# Patient Record
Sex: FEMALE | Race: WHITE | URBAN - METROPOLITAN AREA
[De-identification: names, ages, dates, MRNs, and addresses within clinical notes are randomized per-mention and may not be internally consistent; named-entity substitution may affect disease eponyms.]

---

## 2017-03-21 ENCOUNTER — OFFICE VISIT (OUTPATIENT)
Dept: ORTHOPEDIC SURGERY | Age: 77
End: 2017-03-21

## 2017-03-21 VITALS
HEIGHT: 61 IN | BODY MASS INDEX: 34.93 KG/M2 | SYSTOLIC BLOOD PRESSURE: 142 MMHG | HEART RATE: 61 BPM | WEIGHT: 185 LBS | DIASTOLIC BLOOD PRESSURE: 68 MMHG

## 2017-03-21 DIAGNOSIS — M17.12 PRIMARY OSTEOARTHRITIS OF LEFT KNEE: ICD-10-CM

## 2017-03-21 DIAGNOSIS — G89.29 CHRONIC PAIN OF LEFT KNEE: Primary | ICD-10-CM

## 2017-03-21 DIAGNOSIS — Z96.651 S/P TKR (TOTAL KNEE REPLACEMENT) USING CEMENT, RIGHT: ICD-10-CM

## 2017-03-21 DIAGNOSIS — M71.22 BAKER'S CYST OF KNEE, LEFT: ICD-10-CM

## 2017-03-21 DIAGNOSIS — M25.562 CHRONIC PAIN OF LEFT KNEE: Primary | ICD-10-CM

## 2017-03-21 PROCEDURE — 20610 DRAIN/INJ JOINT/BURSA W/O US: CPT | Performed by: ORTHOPAEDIC SURGERY

## 2017-03-21 RX ORDER — ACETAMINOPHEN 500 MG
500 TABLET ORAL
COMMUNITY

## 2017-03-21 RX ORDER — METOPROLOL SUCCINATE 25 MG/1
TABLET, EXTENDED RELEASE ORAL DAILY
COMMUNITY
Start: 2017-02-24

## 2017-03-21 RX ORDER — ATORVASTATIN CALCIUM 40 MG/1
40 TABLET, FILM COATED ORAL
COMMUNITY
Start: 2017-02-10

## 2017-05-12 ENCOUNTER — OFFICE VISIT (OUTPATIENT)
Dept: ORTHOPEDIC SURGERY | Age: 77
End: 2017-05-12

## 2017-05-12 VITALS
DIASTOLIC BLOOD PRESSURE: 73 MMHG | SYSTOLIC BLOOD PRESSURE: 128 MMHG | BODY MASS INDEX: 34.93 KG/M2 | HEART RATE: 65 BPM | WEIGHT: 185 LBS | HEIGHT: 61 IN

## 2017-05-12 DIAGNOSIS — Z96.651 S/P TKR (TOTAL KNEE REPLACEMENT) USING CEMENT, RIGHT: ICD-10-CM

## 2017-05-12 DIAGNOSIS — M17.12 PRIMARY OSTEOARTHRITIS OF LEFT KNEE: ICD-10-CM

## 2017-05-12 DIAGNOSIS — G89.29 CHRONIC PAIN OF LEFT KNEE: Primary | ICD-10-CM

## 2017-05-12 DIAGNOSIS — M25.562 CHRONIC PAIN OF LEFT KNEE: Primary | ICD-10-CM

## 2017-05-12 DIAGNOSIS — M71.22 BAKER'S CYST OF KNEE, LEFT: ICD-10-CM

## 2017-05-12 PROCEDURE — 20610 DRAIN/INJ JOINT/BURSA W/O US: CPT | Performed by: ORTHOPAEDIC SURGERY

## 2017-06-23 ENCOUNTER — OFFICE VISIT (OUTPATIENT)
Dept: ORTHOPEDIC SURGERY | Age: 77
End: 2017-06-23

## 2017-06-23 VITALS
BODY MASS INDEX: 34.92 KG/M2 | HEART RATE: 60 BPM | WEIGHT: 184.97 LBS | HEIGHT: 61 IN | SYSTOLIC BLOOD PRESSURE: 152 MMHG | DIASTOLIC BLOOD PRESSURE: 74 MMHG

## 2017-06-23 DIAGNOSIS — M25.562 CHRONIC PAIN OF LEFT KNEE: Primary | ICD-10-CM

## 2017-06-23 DIAGNOSIS — M17.12 PRIMARY OSTEOARTHRITIS OF LEFT KNEE: ICD-10-CM

## 2017-06-23 DIAGNOSIS — M71.22 BAKER'S CYST OF KNEE, LEFT: ICD-10-CM

## 2017-06-23 DIAGNOSIS — G89.29 CHRONIC PAIN OF LEFT KNEE: Primary | ICD-10-CM

## 2017-06-23 DIAGNOSIS — Z96.651 S/P TKR (TOTAL KNEE REPLACEMENT) USING CEMENT, RIGHT: ICD-10-CM

## 2017-06-23 PROCEDURE — 20610 DRAIN/INJ JOINT/BURSA W/O US: CPT | Performed by: ORTHOPAEDIC SURGERY

## 2017-08-18 ENCOUNTER — OFFICE VISIT (OUTPATIENT)
Dept: ORTHOPEDIC SURGERY | Age: 77
End: 2017-08-18

## 2017-08-18 VITALS
DIASTOLIC BLOOD PRESSURE: 93 MMHG | HEIGHT: 61 IN | WEIGHT: 185 LBS | BODY MASS INDEX: 34.93 KG/M2 | HEART RATE: 76 BPM | SYSTOLIC BLOOD PRESSURE: 166 MMHG

## 2017-08-18 DIAGNOSIS — M71.22 BAKER'S CYST OF KNEE, LEFT: ICD-10-CM

## 2017-08-18 DIAGNOSIS — G89.29 CHRONIC PAIN OF LEFT KNEE: Primary | ICD-10-CM

## 2017-08-18 DIAGNOSIS — M25.562 CHRONIC PAIN OF LEFT KNEE: Primary | ICD-10-CM

## 2017-08-18 DIAGNOSIS — M17.12 PRIMARY OSTEOARTHRITIS OF LEFT KNEE: ICD-10-CM

## 2017-08-18 DIAGNOSIS — Z96.651 S/P TKR (TOTAL KNEE REPLACEMENT) USING CEMENT, RIGHT: ICD-10-CM

## 2017-08-18 PROCEDURE — 20610 DRAIN/INJ JOINT/BURSA W/O US: CPT | Performed by: ORTHOPAEDIC SURGERY

## 2017-08-18 RX ORDER — AMLODIPINE BESYLATE 5 MG/1
2.5 TABLET ORAL DAILY
COMMUNITY
Start: 2017-08-11

## 2017-10-13 ENCOUNTER — OFFICE VISIT (OUTPATIENT)
Dept: ORTHOPEDIC SURGERY | Age: 77
End: 2017-10-13

## 2017-10-13 ENCOUNTER — HOSPITAL ENCOUNTER (OUTPATIENT)
Dept: GENERAL RADIOLOGY | Facility: MEDICAL CENTER | Age: 77
Discharge: HOME | End: 2017-10-13
Attending: ORTHOPAEDIC SURGERY | Admitting: ORTHOPAEDIC SURGERY

## 2017-10-13 VITALS
HEART RATE: 60 BPM | HEIGHT: 61 IN | WEIGHT: 172 LBS | BODY MASS INDEX: 32.47 KG/M2 | DIASTOLIC BLOOD PRESSURE: 87 MMHG | SYSTOLIC BLOOD PRESSURE: 164 MMHG

## 2017-10-13 DIAGNOSIS — M25.562 CHRONIC PAIN OF LEFT KNEE: Primary | ICD-10-CM

## 2017-10-13 DIAGNOSIS — M17.12 PRIMARY OSTEOARTHRITIS OF LEFT KNEE: ICD-10-CM

## 2017-10-13 DIAGNOSIS — G89.29 CHRONIC PAIN OF LEFT KNEE: ICD-10-CM

## 2017-10-13 DIAGNOSIS — M71.22 BAKER'S CYST OF KNEE, LEFT: ICD-10-CM

## 2017-10-13 DIAGNOSIS — Z96.651 S/P TKR (TOTAL KNEE REPLACEMENT) USING CEMENT, RIGHT: ICD-10-CM

## 2017-10-13 DIAGNOSIS — G89.29 CHRONIC PAIN OF LEFT KNEE: Primary | ICD-10-CM

## 2017-10-13 DIAGNOSIS — M25.561 CHRONIC PAIN OF RIGHT KNEE: ICD-10-CM

## 2017-10-13 DIAGNOSIS — G89.29 CHRONIC PAIN OF RIGHT KNEE: ICD-10-CM

## 2017-10-13 DIAGNOSIS — M25.562 CHRONIC PAIN OF LEFT KNEE: ICD-10-CM

## 2017-10-13 PROCEDURE — 73562 X-RAY EXAM OF KNEE 3: CPT | Performed by: ORTHOPAEDIC SURGERY

## 2017-10-13 PROCEDURE — 20610 DRAIN/INJ JOINT/BURSA W/O US: CPT | Performed by: ORTHOPAEDIC SURGERY

## 2017-10-13 PROCEDURE — 99213 OFFICE O/P EST LOW 20 MIN: CPT | Performed by: ORTHOPAEDIC SURGERY

## 2017-10-13 NOTE — LETTER
FAXED/SENT TO Dr Pura Espino  10/13/17, 12:28 PM        Jasmin Nur MD  Orthopaedic Surgery & Sports Medicine  Knee & Shoulder Specialist      Dear Dr Pura Espino,    Thank you very much for your referral of Ms. Yenny Adan to me for evaluation and treatment. Attached below is my report and recommendations from William Nicolell Michoacano most recent office visit. I appreciate your confidence in me and thank you for allowing me the opportunity to care for your patients. If I can be of any further assistance to you on this or any other patient, please do not hesitate to contact me. Sincerely,    Jasmin Nur MD  Board Certified Orthopaedic Surgeon  Knee and Shoulder Surgery Specialist  Electronically signed by Jasmin Nur MD on 10/13/2017 at 12:28 PM     Contact Information:  Stephen Cooks,   [phone], [address]                             I have personally performed and/or participated in the history, physical exam and medical decision making and reviewed all pertinent clinical information unless otherwise noted.           Jasmin Nur MD  Orthopaedic Surgery & Sports Medicine  Knee & Shoulder Specialist       [address] [facility] OFFICE  [address] [place] [address], [address]nd Floor  [address] [place] [address], [address] [place] [address] [facility]    [phone] Option 3   [phone] Option 3  [phone] (fax)    [phone] (fax)       PATIENT: [address]    68 y.o.  female  [facility]: 1940   MRN:  [id number]       Date of current encounter: 10/13/2017  This encounter is evaluated as a:        New Patient Visit     Established Patient Visit    Post-Op Visit      Consult: requested by          Worker's Comp       Patient's PCP is Dr. Casa Adames post total right knee replacement using cement 6/10/14                 Subjective:     Chief Complaint   Patient presents with    Knee Pain ongoing evaluation & treatment of bilateral knees        HPI:  s/p Right TKR Surgery on 6/10/2014. Usha Andrews returns to the office today in follow-up of her right TKR and for evaluation and injection of her left knee today. She feels she is doing better. She states that she rarely has pain. Ice, and rest help. She would like an injection in her left knee today. 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    Baker's cyst of left knee    Primary osteoarthritis of left knee    Chronic pain of right knee    Chronic pain of left knee     Past Medical History:   Diagnosis Date    Arthritis     right ankle, knees    High blood pressure     High cholesterol     Mini stroke ([facility] [address] 01/2017    Rosacea     Thyroid disease      Past Surgical History:   Procedure Laterality Date    ANKLE SURGERY  22 years ago    COLONOSCOPY      KNEE ARTHROPLASTY Right 6/10/14    RIGHT TOTAL KNEE REPLACEMENT              KNEE SURGERY  6/10/14    right TKR       Allergies:  Review of patient's allergies indicates no known allergies. Medications:  Prior to Visit Medications    Medication Sig Taking?  Authorizing Provider   amLODIPine (NORVASC) 5 MG tablet   Historical Provider, MD   aspirin 81 MG tablet Take 81 mg by mouth  Historical Provider, MD   atorvastatin (LIPITOR) 40 MG tablet Take 40 mg by mouth  Historical Provider, MD   metoprolol succinate (TOPROL XL) 25 MG extended release tablet daily   Historical Provider, MD acetaminophen (TYLENOL) 500 MG tablet Take 500 mg by mouth  Historical Provider, MD   enalapril (VASOTEC) 20 MG tablet 20 mg 2 times daily   Historical Provider, MD   calcium carbonate (TUMS) 500 MG chewable tablet Take  by mouth. Historical Provider, MD   hydrochlorothiazide (HYDRODIURIL) 25 MG tablet Take  by mouth daily. Historical Provider, MD   levothyroxine (SYNTHROID) 50 MCG tablet Take  by mouth daily. Historical Provider, MD     Social History     Social History    Marital status:      Spouse name: N/A    Number of children: 3    Years of education: N/A     Occupational History    retired      Social History Main Topics    Smoking status: Never Smoker    Smokeless tobacco: Never Used    Alcohol use No    Drug use: No    Sexual activity: Not on file     Other Topics Concern    Not on file     Social History Narrative    No narrative on file     No family history on file. Review of Systems (ROS):    Performed. Brittani Adan's review of systems has been performed (Musculoskeletal, Integumentary, CardioPulmonary, Neurological focused) by intake and observation. All past and current ROS forms have been scanned into the medical record. She has been instructed to contact her primary care physician regarding ROS issues if not already being addressed at this time. There are no recent changes. The most recent ROS was scanned into media on 3/21/2017. Objective:   Physical Exam  Vital Signs:  BP (!) 163/80   Pulse 58   Ht 5' 1\" (1.549 m)   Wt 172 lb (78 kg)   BMI 32.50 kg/m²      Constitution:  Generally, Paz Pardo is  alert,  appears stated age, and  in no distress.   Her general body habitus is  Cachectic   Thin   Normal   Obese   Morbidly Obese    Head:  Normocephalic  Eyes:  Extra-occular muscles intact    Left Ear:  External Ear normal   Right Ear:  External Ear normal   Nose:  Normal  Mouth:  Oral mucosa moist   No perioral lesions or loosening. XRay images were reviewed by me personally today 10/13/17. I have reviewed these XRays with her today and I gave her a copy of the AP XRay. Assessment (Medical Decision Making):      (ICD10 code: Z46)       Right TKR (Total knee replacement) (ICD10 code: M55.983)    Sabrina Grullon is a 68y.o. year old female with the following diagnosis:    1. Chronic pain of left knee  XR KNEE LEFT (3 VIEWS)    SC ARTHROCENTESIS ASPIR&/INJ MAJOR JT/BURSA W/O US    SC METHYLPREDNISOLONE 40 MG INJ   2. Primary osteoarthritis of left knee  SC ARTHROCENTESIS ASPIR&/INJ MAJOR JT/BURSA W/O US    SC METHYLPREDNISOLONE 40 MG INJ   3. Baker's cyst of knee, left     4. Chronic pain of right knee  XR KNEE RIGHT (3 VIEWS)   5. S/P TKR (total knee replacement) using cement, right on 6/10/2014         Her overall course:         Responding adequately to ongoing treatment after surgery (right knee) and responding to conservative treatment (left knee)      Worsening despite postoperative treatment       Unchanged despite postoperative treatment     Plan (Medical Decision Making): MEDICATIONS/REFILLS prescribed today are listed below. No refills today     Physical Therapy/HEP activities as tolerated      Script: 2 x per week x 4 weeks     Ice to affected area: 15-20 minutes 4 x day     Over the Counter/Suppliment Tx     Drinking 6-8 oz of Tart Cherry Juice     Vit D 3 (at least 2,000 IU/day)     We did talk about a left total knee replacement however currently her symptoms are being controlled with conservative treatment. She would like an injection in her left knee today. PROCEDURE NOTE: LEFT KNEE INJECTION  10/13/2017 at 12:27 PM   Procedure: Injection  Verbal consent was obtained. Risks and benefits were explained. Questions were encouraged and answered.       Timeout Verification Completed including:    Correct patient: Sabrina Grullon was identified    Correct procedure First phase: First phase is The Pain Phase. There is plenty of pain medication (and pharmacy can be consulted if needed). TKR is a painful operation however pain management has improved significantly. Second phase: The second phase is Not Sleeping Phase. It is common for total knee replacement patients not to sleep well after total knee replacement. This can go on for several months. Third phase: The third phase is Depression. The \"not sleeping at night phase\" leads to the third phase in which patients often experience depression/the blues, feeling that \"this will never get any better\". It will get better. Fourth phase: The fourth phase is Swelling and Warmth. Around 4-6 weeks, patients will start to compare their total knee replacement knee with their other knee. They note that the total knee replacement knee is still a bit more swollen and a bit more warm compared to the other knee. This is normal.    Fifth phase: The fifth phase is \"my knee makes noises\" phase. It is common for patients to note that the total knee replacement makes noises. Terrence Weinstein was instructed to apply ice to the injection area for 15 - 20 minutes several times a day to decrease pain and and swelling. Ice (\"ICE IS YOUR FRIEND\": try using a bag of frozen peas or corn) for 15  20 minutes 3 x day. Limit activities today. You may resume normal activities tomorrow if you have no pain. For severe pain:  If after hours, she is to go to Emergency Room. During office hours she must come in to the office. Terrence Weinstein was instructed to call the office if there are any questions or concerns related to her condition. I have asked her to schedule a follow-up appointment for 6-8 weeks from now for re-evaluation and possible repeat injection. She is specifically instructed to contact the office between now & her scheduled appointment if she has concerns related to her condition.  She is welcome to call for an appointment sooner if she has any additional concerns or questions. General Medication Instructions:  Any prescriptions must be used as directed. If you have any concerns, questions or require refills, please contact our office. If you experience any adverse reactions, stop the medication and call our office immediately. If you designate a preferred pharmacy, appropriate prescriptions will be sent to your preferred pharmacy for pickup to be use as directed. Patient Driving Instructions:  No driving if you are taking narcotic pain medications or muscle relaxers. PATIENT REMINDER:   Carry a list of your medications and allergies with you at all times. Call your pharmacy and our office at least 1 week in advance to refill prescriptions. Narcotic medications will not be refilled after hours or on weekends. ATTENTION    As of October 2, 2014, the [place] [address] ([address]) has mandated that ALL PRESCRIPTION PAIN MEDICINE cannot be called into your pharmacy. This includes:    Oxycodone (Percocet)  Hydrocodone (Vicodin, Norco)  Tramadol (Ultram)  Other    These medications must have prescriptions which are written and signed by your doctor (Dr. Geni Fink). This means that you must call ahead and come in to the office to  the paper prescription and take it to your pharmacy. We are sorry for any inconvenience but this is now the law. Erik Vogel MD  Board Certified Orthopaedic Surgeon  Knee and Shoulder Surgery Specialist    Contact Information:  Warner Gonzalezens,   [phone], [address]         IMPORTANT NARCOTIC INFORMATION: PLEASE READ      DO NOT share your prescription medication with anyone! Sharing is illegal.  The prescription dose is based on your age, weight, and health problems. Sharing your narcotic prescription can lead to accidental death of the individual for which the prescription was not prescribed.   You may not know about his/her addiction problem. Always use the same pharmacy when filling your narcotic prescriptions. DO NOT mix your narcotic prescription with alcohol. Mixing the narcotic prescription medication with alcohol causes depressive effects including breathing problems, organ malfunction, and cardiac arrest.      Always keep your narcotic medication in a locked, secured location. Keep your medication private. This is to avoid individuals from taking your medication without your knowledge. This medication is highly sought after and locking your prescriptions will protect you from being a target of crime. DO NOT stock pile your medication. This also will protect you from being a target of crime. Dispose of any unused medications properly. Do not flush the medications. Look for appropriate waste collection programs in your community and drug take back events. DO NOT drive while taking narcotic pain medication or muscle relaxers. FOR MORE INFORMATION, CHECK OUT THIS RESOURCE    For your convenience, Dr. Lizz Booker has provided the following link that may be helpful for you if you would like more information on your Orthopaedic condition:    www. Orthoinfo. org         If you or someone you know struggles with weight issues and joint pain, please check out this important documentary:      \"Start Moving Start Living\" =  Http://startmovingstHumanAPI.[email or web address]       (WellkeeperUBE video SMSL FULL SD)           FALL PREVENTION:  SIMPLE TIPS    Vitamin D3:  Over-the-counter supplement of Vitamin D3, (at least 2,000 IU daily). Vitamin D3 is widely available without a prescription at pharmacies and buying clubs ([facility], [facility]) and on-line at sites such as Amazon.com. It comes in a variety of formulations (tablets, gelcaps, liquid) and doses (1,000 IU, 2,000 IU, 4,000 IU, 5,000 IU and even higher).  The right dose for most people is 2,000 IU per day but higher

## 2017-10-13 NOTE — PROGRESS NOTES
DEPO-MEDROL CORTISONE INJECTION  NDC# [address]-09  LOT# [id number]  EXP.  DATE: 1/2020  SITE: Lt. Knee

## 2017-10-13 NOTE — PROGRESS NOTES
Mauro Snellen MD  Orthopaedic Surgery & Sports Medicine  Knee & Shoulder Specialist      [x] [facility] OFFICE   [] [facility] OFFICE  [address]  [address] [address], [address] [place] [address] [address]    [phone] Option 3   [phone] Option 3  [phone] (fax)    [phone] (fax)       PATIENT: Liliana Hernandez    68 y.o.  female  [facility]: 1940   MRN:  [id number]       Date of current encounter: 10/13/2017  This encounter is evaluated as a:       [] New Patient Visit    [x] Established Patient Visit   [] Post-Op Visit     [] Consult: requested by         [] Worker's Comp       Patient's PCP is Dr. Javed Ozuna post total right knee replacement using cement 6/10/14                 Subjective:     Chief Complaint   Patient presents with    Knee Pain     ongoing evaluation & treatment of bilateral knees        HPI:  s/p Right TKR Surgery on 6/10/2014. Liliana Hernandez returns to the office today in follow-up of her right TKR and for evaluation and injection of her left knee today. She feels she is doing better. She states that she rarely has pain. Ice, and rest help. She would like an injection in her left knee today.     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 No refills today    [x] Physical Therapy/HEP activities as tolerated     [] Script: 2 x per week x 4 weeks    [x] Ice to affected area: 15-20 minutes 4 x day    [x] Over the Counter/Suppliment Tx     Drinking 6-8 oz of Tart Cherry Juice     Vit D 3 (at least 2,000 IU/day)    [x] We did talk about a left total knee replacement however currently her symptoms are being controlled with conservative treatment. She would like an injection in her left knee today. PROCEDURE NOTE: LEFT KNEE INJECTION  10/13/2017 at 12:27 PM   Procedure: Injection  Verbal consent was obtained. Risks and benefits were explained. Questions were encouraged and answered. Timeout Verification Completed including:    Correct patient: Brody Santana was identified    Correct procedure    Correct site & side    Correct equipment and supplies    Staff member: Zari Florez MD     Assistant: Sonya Leyva     Injection Site: Superolateral    The injection site was prepped with Chlora-Prep using aseptic technique and a left knee intra-articular injection was performed with ethyl chloride for anesthetic. Depomedrol 2 ml (40 mg/ml = 80 mg total) was injected. A sterile adhesive dressing was applied. Post procedure:  Sofia Adan tolerated the treatment well. Instructions to patient:  Appropriate post injections instructions were given to Brody Santana.          Return to Clinic/Follow - Up:  Brody Santana was asked to make a follow-up appointment:    [x] In a year, or sooner if needed for her right knee, 6-8 weeks if needed for her left knee    Brody Santana was instructed to call the office if her symptoms worsen or if same pharmacy when filling your narcotic prescriptions. [x] DO NOT mix your narcotic prescription with alcohol. Mixing the narcotic prescription medication with alcohol causes depressive effects including breathing problems, organ malfunction, and cardiac arrest.     [x] Always keep your narcotic medication in a locked, secured location. Keep your medication private. This is to avoid individuals from taking your medication without your knowledge. This medication is highly sought after and locking your prescriptions will protect you from being a target of crime. [x] DO NOT stock pile your medication. This also will protect you from being a target of crime. [x] Dispose of any unused medications properly. Do not flush the medications. Look for appropriate waste collection programs in your community and drug take back events. [x] DO NOT drive while taking narcotic pain medication or muscle relaxers. Reminder:  The phases following a total knee replacement that most patients often encounter. Briefly:    First phase: First phase is The Pain Phase. There is plenty of pain medication (and pharmacy can be consulted if needed). TKR is a painful operation however pain management has improved significantly. Second phase: The second phase is Not Sleeping Phase. It is common for total knee replacement patients not to sleep well after total knee replacement. This can go on for several months. Third phase: The third phase is Depression. The \"not sleeping at night phase\" leads to the third phase in which patients often experience depression/the blues, feeling that \"this will never get any better\". It will get better. Fourth phase: The fourth phase is Swelling and Warmth. Around 4-6 weeks, patients will start to compare their total knee replacement knee with their other knee.   They note that the total knee replacement knee is still a bit more swollen and a bit more warm compared to the other knee. This is normal.    Fifth phase: The fifth phase is \"my knee makes noises\" phase. It is common for patients to note that the total knee replacement makes noises. Marilee Carreno was instructed to apply ice to the injection area for 15 - 20 minutes several times a day to decrease pain and and swelling. Ice (\"ICE IS YOUR FRIEND\": try using a bag of frozen peas or corn) for 15  20 minutes 3 x day. Limit activities today. You may resume normal activities tomorrow if you have no pain. For severe pain:  If after hours, she is to go to Emergency Room. During office hours she must come in to the office. Marilee Carreno was instructed to call the office if there are any questions or concerns related to her condition. I have asked her to schedule a follow-up appointment for 6-8 weeks from now for re-evaluation and possible repeat injection. She is specifically instructed to contact the office between now & her scheduled appointment if she has concerns related to her condition. She is welcome to call for an appointment sooner if she has any additional concerns or questions. General Medication Instructions:  Any prescriptions must be used as directed. If you have any concerns, questions or require refills, please contact our office. If you experience any adverse reactions, stop the medication and call our office immediately. If you designate a preferred pharmacy, appropriate prescriptions will be sent to your preferred pharmacy for pickup to be use as directed. Patient Driving Instructions:  No driving if you are taking narcotic pain medications or muscle relaxers. PATIENT REMINDER:   Carry a list of your medications and allergies with you at all times. Call your pharmacy and our office at least 1 week in advance to refill prescriptions. Narcotic medications will not be refilled after hours or on weekends.          ATTENTION    As of October 2, 2014, the [facility] Drug Enforcement Agency ([place]) has mandated that ALL PRESCRIPTION PAIN MEDICINE cannot be called into your pharmacy. This includes:    Oxycodone (Percocet)  Hydrocodone (Vicodin, Norco)  Tramadol (Ultram)  Other    These medications must have prescriptions which are written and signed by your doctor (Dr. Milena Newton). This means that you must call ahead and come in to the office to  the paper prescription and take it to your pharmacy. We are sorry for any inconvenience but this is now the law. Virgilio Rdz MD  Board Certified Orthopaedic Surgeon  Knee and Shoulder Surgery Specialist    Contact Information:  Leopold Gut,   [phone], [address]         IMPORTANT NARCOTIC INFORMATION: PLEASE READ     [x] DO NOT share your prescription medication with anyone! Sharing is illegal.  The prescription dose is based on your age, weight, and health problems. Sharing your narcotic prescription can lead to accidental death of the individual for which the prescription was not prescribed. You may not know about his/her addiction problem. [x] Always use the same pharmacy when filling your narcotic prescriptions. [x] DO NOT mix your narcotic prescription with alcohol. Mixing the narcotic prescription medication with alcohol causes depressive effects including breathing problems, organ malfunction, and cardiac arrest.     [x] Always keep your narcotic medication in a locked, secured location. Keep your medication private. This is to avoid individuals from taking your medication without your knowledge. This medication is highly sought after and locking your prescriptions will protect you from being a target of crime. [x] DO NOT stock pile your medication. This also will protect you from being a target of crime. [x] Dispose of any unused medications properly. Do not flush the medications.   Look for appropriate waste collection programs in your community and

## 2017-10-13 NOTE — PATIENT INSTRUCTIONS
PATIENT INSTRUCTIONS  For Malik Peters  Office Visit with Dr Alaina Love on 10/13/2017    Activity Instructions:   No restrictions however if your knee is painful during various activities:    Modify or decrease your activity    Regular exercise is encouraged     Weight Bearing:   Full weightbearing    Incision Instructions: Ice to knee for 15  20 minutes 3 x day as needed    (\"ICE IS YOUR FRIEND\": try using a bag of frozen peas or corn)    If in the sun, protect incisions from sun by using:     Sunscreen 30-50 SPF, reapply regularly. Follow-up Care:   I have asked Chrissy Adan to schedule a follow-up appointment in 12 months. She is specifically instructed to contact the office between now & her scheduled appointment if she has concerns related to her condition. She is welcome to call for an appointment sooner if she has any additional concerns or questions. Medication Instructions: for Malik Lorraine  Allergies: Review of patient's allergies indicates no known allergies. Any prescriptions must be used as directed. Narcotic prescriptions will be printed out and given to you in the office. Non-narcotic prescriptions will be sent to your pharmacy for pickup to be use as directed unless you request a printed prescription. If you have any concerns, questions or require refills, please contact our office ([phone], Option 3). If you experience any adverse reactions, stop the medication and call our office immediately. Antibiotics after Total Knee Replacements:     [x] Prophylactic antibiotics before routine dental cleaning are not required. [x] Other surgeries/procedures do require antibiotics (colonoscopy, abcesses/ active infections, etc.)    If you are NOT allergic to penicillin: 2 grams of amoxicillin, cephalexin or cephradine (orally) OR 2 grams of ampicillin or 1 gram of cefazolin (intra-muscularly or intravenously) 1 hour before the procedure.     If you ARE allergic to penicillin[de-identified] 600 milligrams of clindamycin (orally or intravenously) 1 hour before the procedure. PLEASE NOTE: Antibiotics may vary based on clinical situation and culture results. PATIENT REMINDER:   Carry a list of your medications and allergies with you at all times. Call your pharmacy and our office at least 1 week in advance to refill prescriptions. Narcotic medications will not be refilled after hours or on weekends. ATTENTION    As of October 2, 2014, the [place] [address] ([address] [place] [address]) has mandated that ALL PRESCRIPTION PAIN MEDICINE cannot be called into your pharmacy. This includes:    Oxycodone (Percocet)  Hydrocodone (Vicodin, Norco)  Tramadol (Ultram)  Other    These medications must have prescriptions which are written and signed by your doctor (Dr. Al Vega). This means that you must call ahead and come in to the office to  the paper prescription and take it to your pharmacy. We are sorry for any inconvenience but this is now the law. Alaina Love MD  Board Certified Orthopaedic Surgeon  Knee and Shoulder Surgery Specialist    Contact Information:  Ida Hernandez,   [phone], Option 3         IMPORTANT NARCOTIC INFORMATION: PLEASE READ     [x] DO NOT share your prescription medication with anyone! Sharing is illegal.  The prescription dose is based on your age, weight, and health problems. Sharing your narcotic prescription can lead to accidental death of the individual for which the prescription was not prescribed. You may not know about his/her addiction problem. [x] Always use the same pharmacy when filling your narcotic prescriptions. [x] DO NOT mix your narcotic prescription with alcohol.   Mixing the narcotic prescription medication with alcohol causes depressive effects including breathing problems, organ malfunction, and cardiac arrest.     [x] Always keep your narcotic medication in a locked, secured location. Keep your medication private. This is to avoid individuals from taking your medication without your knowledge. This medication is highly sought after and locking your prescriptions will protect you from being a target of crime. [x] DO NOT stock pile your medication. This also will protect you from being a target of crime. [x] Dispose of any unused medications properly. Do not flush the medications. Look for appropriate waste collection programs in your community and drug take back events. [x] DO NOT drive while taking narcotic pain medication or muscle relaxers. Reminder:  The phases following a total knee replacement that most patients often encounter. Briefly:    First phase: First phase is The Pain Phase. There is plenty of pain medication (and pharmacy can be consulted if needed). TKR is a painful operation however pain management has improved significantly. Second phase: The second phase is Not Sleeping Phase. It is common for total knee replacement patients not to sleep well after total knee replacement. This can go on for several months. Third phase: The third phase is Depression. The \"not sleeping at night phase\" leads to the third phase in which patients often experience depression/the blues, feeling that \"this will never get any better\". It will get better. Fourth phase: The fourth phase is Swelling and Warmth. Around 4-6 weeks, patients will start to compare their total knee replacement knee with their other knee. They note that the total knee replacement knee is still a bit more swollen and a bit more warm compared to the other knee. This is normal.    Fifth phase: The fifth phase is \"my knee makes noises\" phase. It is common for patients to note that the total knee replacement makes noises.                 Sabrina Grullon was instructed to apply ice to the injection area for 15 - 20 minutes several times a day to decrease pain and and condition:    www. [email or web address]. org         If you or someone you know struggles with weight issues and joint pain, please check out this important documentary:      \"Start Moving Start Living\" =  [email or web address]       (YOUTUBE video SMSL FULL SD)           FALL PREVENTION:  SIMPLE TIPS    Vitamin D3:  Over-the-counter supplement of Vitamin D3, (at least 2,000 IU daily). Vitamin D3 is widely available without a prescription at pharmacies and buying clubs ([facility], [facility]) and on-line at sites such as Amazon.com. It comes in a variety of formulations (tablets, gelcaps, liquid) and doses (1,000 IU, 2,000 IU, 4,000 IU, 5,000 IU and even higher). The right dose for most people is 2,000 IU per day but higher doses are sometimes needed. You can take your vitamin D3 at any time of the day, with or without food, with or without calcium. Because vitamin D is long acting, if you miss your vitamin D on one day you can double up the dose on a later day. Exercise: Low impact exercise programs such as [employer]. Chair Raise Exercise. Yoga. Adult fitness classes at the  or community [place]. Physical Therapy: Formal physical therapy program to strengthen your lower extremities, improve your balance and gait. Home Assessment: Remove clutter and tripping hazards: loose/throw rugs, cords or cables. Install or placement of railings, grab bars around steps/stairs and in the bathroom (toilet, bath tub, sink). Improve lighting. Foot Wear:  Stable, well fitting. Avoid loose straps or ties, slippery soles. Vision/Eye Check Up: Have your vision checked yearly. Resources:  www.cdc.gov/injury/STEADI    1). STEADI: Stay Independent Self Risk Assessment    2). CDC Handouts:  How to prevent falls, Home Safety Fall Prevention         If you or someone you know struggles with weight issues and joint pain, please check out this important documentary:      \"Start Moving Start Living\" = [email or web address]       (YOUTUBE video SMSL FULL SD)

## 2017-12-04 ENCOUNTER — OFFICE VISIT (OUTPATIENT)
Dept: ORTHOPEDIC SURGERY | Age: 77
End: 2017-12-04

## 2017-12-04 VITALS
DIASTOLIC BLOOD PRESSURE: 86 MMHG | SYSTOLIC BLOOD PRESSURE: 138 MMHG | BODY MASS INDEX: 32.47 KG/M2 | HEIGHT: 61 IN | WEIGHT: 172 LBS | HEART RATE: 65 BPM

## 2017-12-04 DIAGNOSIS — M71.22 BAKER'S CYST OF KNEE, LEFT: ICD-10-CM

## 2017-12-04 DIAGNOSIS — G89.29 CHRONIC PAIN OF LEFT KNEE: Primary | ICD-10-CM

## 2017-12-04 DIAGNOSIS — Z96.651 S/P TKR (TOTAL KNEE REPLACEMENT) USING CEMENT, RIGHT: ICD-10-CM

## 2017-12-04 DIAGNOSIS — M17.12 PRIMARY OSTEOARTHRITIS OF LEFT KNEE: ICD-10-CM

## 2017-12-04 DIAGNOSIS — M25.562 CHRONIC PAIN OF LEFT KNEE: Primary | ICD-10-CM

## 2017-12-04 PROCEDURE — 20610 DRAIN/INJ JOINT/BURSA W/O US: CPT | Performed by: ORTHOPAEDIC SURGERY

## 2017-12-04 PROCEDURE — 99999 PR OFFICE/OUTPT VISIT,PROCEDURE ONLY: CPT | Performed by: ORTHOPAEDIC SURGERY

## 2017-12-04 NOTE — PATIENT INSTRUCTIONS
Tonya Chazden has provided the following link that may be helpful for you if you would like more information on your Orthopaedic condition:    www. [email or web address]. org         If you or someone you know struggles with weight issues and joint pain, please check out this important documentary:      \"Start Moving Start Living\" =  [email or web address]       (YOUTUBE video SMSL FULL SD)           FALL PREVENTION:  SIMPLE TIPS    Vitamin D3:  Over-the-counter supplement of Vitamin D3, (at least 2,000 IU daily). Vitamin D3 is widely available without a prescription at pharmacies and buying clubs ([facility], [facility]) and on-line at sites such as Amazon.com. It comes in a variety of formulations (tablets, gelcaps, liquid) and doses (1,000 IU, 2,000 IU, 4,000 IU, 5,000 IU and even higher). The right dose for most people is 2,000 IU per day but higher doses are sometimes needed. You can take your vitamin D3 at any time of the day, with or without food, with or without calcium. Because vitamin D is long acting, if you miss your vitamin D on one day you can double up the dose on a later day. Exercise: Low impact exercise programs such as [employer]. Chair Raise Exercise. Yoga. Adult fitness classes at the  or community center. Physical Therapy: Formal physical therapy program to strengthen your lower extremities, improve your balance and gait. Home Assessment: Remove clutter and tripping hazards: loose/throw rugs, cords or cables. Install or placement of railings, grab bars around steps/stairs and in the bathroom (toilet, bath tub, sink). Improve lighting. Foot Wear:  Stable, well fitting. Avoid loose straps or ties, slippery soles. Vision/Eye Check Up: Have your vision checked yearly. Resources:  www.cdc.gov/injury/STEADI    1). STEADI: Stay Independent Self Risk Assessment    2). CDC Handouts:  How to prevent falls, Home Safety Fall Prevention         If you or someone you know struggles with weight issues and joint pain, please check out this important documentary:      \"Start Moving Start Living\" =  [email or web address]       (YOUTUBE video SMSL FULL SD)

## 2017-12-04 NOTE — PROGRESS NOTES
Martita Giang MD  Orthopaedic Surgery & Sports Medicine  Knee & Shoulder Specialist      [x] [facility] OFFICE   [] [facility] OFFICE  [address] [place] [address], 2nd Floor  [address] [address], [address] [facility], [facility]    [phone] Option 3   [phone] Option 3  [phone] (fax)    [phone] (fax)       PATIENT: Sean Smith    68 y.o.  female  [facility]: 1940   MRN:  [id number]       Date of current encounter: 12/4/2017  This encounter is evaluated as a:       [] New Patient Visit    [x] Established Patient Visit   [] Post-Op Visit     [] Consult: requested by         [] Worker's Comp       Patient's PCP is Dr. Casey Alfredo post total right knee replacement using cement 6/10/14              Subjective:     Chief Complaint   Patient presents with    Knee Pain     ongoing evaluation and treatment of left knee        HPI:  Sean Smith is a 68y.o. year old,  female complaining of left knee pain. She states that her left knee pain is approximately a 4. At times she has throbbing achy pain. Her knee pain is intermittent, depending on what she is doing. Stretching, straightening and exercise aggravates her left knee. Her left knee pain is limiting behavior. Rest and Tylenol do help. Her knee is getting worse. She is feeling stiffness behind her knee. She is starting to think about a left total knee replacement but is not quite ready. She would like an injection in her left knee today.     PAIN ASSESSMENT:   Pain Assessment  Location of Pain: Knee  Location Modifiers: Left  Severity of Pain: 4  Quality of Pain: Throbbing, Aching  Duration of Pain: Persistent  Frequency of Pain: Intermittent  Date Pain First Started:  (ongoing for years)  Aggravating Factors: Stretching, Straightening, Exercise  Limiting Behavior: Some  Relieving Factors: Rest, Other (Comment) (tylenol)  Result of Injury: No  Work-Related Injury: No  Are there other pain locations you wish to document?: No    Patient Active Problem List   Diagnosis    Hyperlipidemia    Essential hypertension    Hypothyroidism    S/P TKR (total knee replacement) using cement, right    Baker's cyst of knee, left    Primary osteoarthritis of left knee    Chronic pain of right knee    Chronic pain of left knee     Past Medical History:   Diagnosis Date    Arthritis     right ankle, knees    High blood pressure     High cholesterol     Mini stroke ([address] 01/2017    Rosacea     Thyroid disease      Past Surgical History:   Procedure Laterality Date    ANKLE SURGERY  22 years ago    COLONOSCOPY      KNEE ARTHROPLASTY Right 6/10/14    RIGHT TOTAL KNEE REPLACEMENT              KNEE SURGERY  6/10/14    right TKR       Allergies:  Review of patient's allergies indicates no known allergies. Medications:  Prior to Visit Medications    Medication Sig Taking? Authorizing Provider   amLODIPine (NORVASC) 5 MG tablet 2.5 mg daily   Historical Provider, MD   aspirin 81 MG tablet Take 81 mg by mouth  Historical Provider, MD   atorvastatin (LIPITOR) 40 MG tablet Take 40 mg by mouth  Historical Provider, MD   metoprolol succinate (TOPROL XL) 25 MG extended release tablet daily   Historical Provider, MD   acetaminophen (TYLENOL) 500 MG tablet Take 500 mg by mouth  Historical Provider, MD   enalapril (VASOTEC) 20 MG tablet 20 mg 2 times daily   Historical Provider, MD   calcium carbonate (TUMS) 500 MG chewable tablet Take  by mouth. Historical Provider, MD   hydrochlorothiazide (HYDRODIURIL) 25 MG tablet Take  by mouth daily. Historical Provider, MD   levothyroxine (SYNTHROID) 50 MCG tablet Take  by mouth daily.   Historical Provider, MD     Social History     Social History    Marital status:      Spouse name: N/A    Number of children: 3    Years of education: N/A     Occupational History    retired      Social History Main Topics    Smoking status: Never Smoker  Smokeless tobacco: Never Used    Alcohol use No    Drug use: No    Sexual activity: Not on file     Other Topics Concern    Not on file     Social History Narrative    No narrative on file     No family history on file. Review of Systems (ROS):    [x]Performed. Regan Adan's review of systems has been performed (Musculoskeletal, Integumentary, CardioPulmonary, Neurological focused) by intake and observation. All past and current ROS forms have been scanned into the medical record. She has been instructed to contact her primary care physician regarding ROS issues if not already being addressed at this time. There are no recent changes. The most recent ROS was scanned into media on 3/21/2017. Objective:   Physical Exam  Vital Signs:  /86   Pulse 65   Ht 5' 1\" (1.549 m)   Wt 172 lb (78 kg)   BMI 32.50 kg/m²     Constitution:  Generally, Murray gNuyen is [x] alert, [x] appears stated age, and [x] in no distress. Her general body habitus is [] Cachectic  [] Thin  [] Normal  [x] Obese  [] Morbidly Obese    Head: [x] Normocephalic  Eyes: [x] Extra-occular muscles intact   Left Ear: [x] External Ear normal   Right Ear: [x] External Ear normal   Nose: [x] Normal  Mouth: [x] Oral mucosa moist  [x] No perioral lesions    Pulmonary: [x] Respirations unlabored and regular  Skin: [x] Warm [x] Well perfused     Psychiatric:   [x] Good judgement and insight  [x] Oriented to [x] person, [x] place, and [x] time  [x] Mood appropriate for circumstances    Gait:  Gait is [] Normal  [x] Impaired Slight limp  Assistive Device: [x] None  [] Knee Brace  [] Deana Pinks  [] Crutches   [] Gregg Majano   [] Wheelchair  [] Other    ORTHOPAEDIC KNEE EXAM:  []  RIGHT     [x]  LEFT     []  BILATERAL   Inspection:  [x] Skin intact without abrasion or lacerations.   [x] Ecchymosis:  [x] none  [] mild  [] moderate  [] severe  [x] Atrophy:  [x] none  [] mild  [] moderate  [] severe  [x] Effusion: [x] none  [] mild  [] moderate  [] severe  [] Scar / Surgical incision(s): [] A-Scope Portals  [] Open Surgical Incision(s)    Alignment:  [x] Normal  [] Varus [] Valgus    Range of Motion:  [] Normal Knee ROM         [] Deferred: acute injury/post-surgery/pain   [x] Limited ROM:     Palpation:   [] No Tenderness  [x] Tenderness: Baker's cyst posterior [x] mild  [] moderate  [] severe   [x] Patellofemoral Crepitation:  [] none  [x] mild  [] moderate  [] severe  [x] Baker's Cyst:  [] none  [x] small  [] medium  [] large    Motor Function:   [x] No gross motor weakness  [] Motor weakness:  [] mild  [] moderate  [] severe     Neurologic:  [x] Sensation to light touch intact   [x] Coordination / proprioception intact    Circulation:  [x] The limb is warm and well perfused  [x] Capillary refill is intact. [x] Edema  [x] none  [] mild  [] moderate  [] severe  [x] Venous stasis changes  [x] none  [] mild  [] moderate  [] severe    Contralateral Knee:  [x] No pain with ROM   [x]  Well-healed anterior midline surgical incision consistent with: right total knee replacement      Bilateral Hip Exam:  [x] Negative logroll     Data Reviewed:     No imaging studies were obtained today. XRays:  (3 views: AP, lateral and patella views) of her right knee taken on 10/13/17 showed a satisfactory TKR with no evidence of acute fracture or loosening. PROCEDURE NOTE: LEFT KNEE INJECTION  12/4/2017 at 2:15 PM   Procedure: Injection  Verbal consent was obtained. Risks and benefits were explained. Questions were encouraged and answered.       Timeout Verification Completed including:    Correct patient: Emily Rasmussen was identified    Correct procedure    Correct site & side    Correct equipment and supplies    Staff member: Bulmaro Jacob MD     Assistant: Akosua Hicks       Injection Site: Superolateral    The injection site was prepped with Chlora-Prep using aseptic technique and a left knee intra-articular injection was performed with ethyl chloride for anesthetic. Depomedrol 2 ml (40 mg/ml = 80 mg total) was injected. A sterile adhesive dressing was applied. Post procedure:  Fatuma Adan tolerated the treatment well. Instructions to patient:  Appropriate post injections instructions were given to Radha Medeiros. Assessment (Medical Decision Making):     Radha Medeiros is a 68y.o. year old female with the following diagnosis:    1. Chronic pain of left knee  WV ARTHROCENTESIS ASPIR&/INJ MAJOR JT/BURSA W/O US    WV METHYLPREDNISOLONE 40 MG INJ   2. Primary osteoarthritis of left knee  [place] ARTHROCENTESIS ASPIR&/INJ MAJOR JT/BURSA W/O US    [place] METHYLPREDNISOLONE 40 MG INJ   3. Baker's cyst of knee, left     4. S/P TKR (total knee replacement) using cement, right on 6/10/2014         Her overall course:       []  Responding adequately to ongoing treatment    [x]  Worsening despite conservative treatment    []  Unchanged despite conservative treatment    Plan (Medical Decision Making):      I discussed the diagnosis and the treatment options with Radha Medeiros today. In Summary:  1). The various treatment options were outlined and discussed with Radha Medeiros including:      [x] Conservative care options:      physical therapy, ice, NSAIDs, bracing, and activity modification       [x] The indications for therapeutic injections Steroids and Hyluronic Acid/Synvisc/Euflexxa/Supartz      [x] The indications for (possible future) operative intervention Left total knee replacement if conservative measures fail to control her pain    2). After considering the various options discussed, aRdha Medeiros elected to pursue a course of treatment that includes the following:      [x] MEDICATIONS/REFILLS prescribed today are listed below.        No refills today    [x] Physical Therapy/HEP Activities as tolerated       [] Script: 2 x per week x 4 weeks    [x] Ice to affected area: 15-20 minutes 4 x day    [x] will be sent to your preferred pharmacy for pickup to be use as directed. Patient Driving Instructions:  No driving if you are taking narcotic pain medications or muscle relaxers. PATIENT REMINDER:   Carry a list of your medications and allergies with you at all times. Call your pharmacy and our office at least 1 week in advance to refill prescriptions. Narcotic medications will not be refilled after hours or on weekends. ATTENTION    As of October 2, 2014, the [place] [address] ([facility]) has mandated that ALL PRESCRIPTION PAIN MEDICINE cannot be called into your pharmacy. This includes:    Oxycodone (Percocet)  Hydrocodone (Vicodin, Norco)  Tramadol (Ultram)  Other    These medications must have prescriptions which are written and signed by your doctor (Dr. Natalio Vasquez). This means that you must call ahead and come in to the office to  the paper prescription and take it to your pharmacy. We are sorry for any inconvenience but this is now the law. Cara Mitcehll MD  Board Certified Orthopaedic Surgeon  Knee and Shoulder Surgery Specialist    Contact Information:  Yonis Ring,   [phone], Option 3         IMPORTANT NARCOTIC INFORMATION: PLEASE READ     [x] DO NOT share your prescription medication with anyone! Sharing is illegal.  The prescription dose is based on your age, weight, and health problems. Sharing your narcotic prescription can lead to accidental death of the individual for which the prescription was not prescribed. You may not know about his/her addiction problem. [x] Always use the same pharmacy when filling your narcotic prescriptions. [x] DO NOT mix your narcotic prescription with alcohol. Mixing the narcotic prescription medication with alcohol causes depressive effects including breathing problems, organ malfunction, and cardiac arrest.     [x] Always keep your narcotic medication in a locked, secured location.   Keep your medication private. This is to avoid individuals from taking your medication without your knowledge. This medication is highly sought after and locking your prescriptions will protect you from being a target of crime. [x] DO NOT stock pile your medication. This also will protect you from being a target of crime. [x] Dispose of any unused medications properly. Do not flush the medications. Look for appropriate waste collection programs in your community and drug take back events. [x] DO NOT drive while taking narcotic pain medication or muscle relaxers. FOR MORE INFORMATION, CHECK OUT THIS RESOURCE    For your convenience, Dr. Milena Newton has provided the following link that may be helpful for you if you would like more information on your Orthopaedic condition:    www. Orthoinfo. org         If you or someone you know struggles with weight issues and joint pain, please check out this important documentary:      \"Start Moving Start Living\" =  Http://startmovingContapps.Sproutel       (AldagenUBE video SMSL FULL SD)           FALL PREVENTION:  SIMPLE TIPS    Vitamin D3:  Over-the-counter supplement of Vitamin D3, (at least 2,000 IU daily). Vitamin D3 is widely available without a prescription at pharmacies and buying clubs ([facility], [facility]) and on-line at sites such as Amazon.com. It comes in a variety of formulations (tablets, gelcaps, liquid) and doses (1,000 IU, 2,000 IU, 4,000 IU, 5,000 IU and even higher). The right dose for most people is 2,000 IU per day but higher doses are sometimes needed. You can take your vitamin D3 at any time of the day, with or without food, with or without calcium. Because vitamin D is long acting, if you miss your vitamin D on one day you can double up the dose on a later day. Exercise: Low impact exercise programs such as [employer]. Chair Raise Exercise. Yoga. Adult fitness classes at the  or community [place].     Physical Therapy: Formal physical therapy program to strengthen your lower extremities, improve your balance and gait. Home Assessment: Remove clutter and tripping hazards: loose/throw rugs, cords or cables. Install or placement of railings, grab bars around steps/stairs and in the bathroom (toilet, bath tub, sink). Improve lighting. Foot Wear:  Stable, well fitting. Avoid loose straps or ties, slippery soles. Vision/Eye Check Up: Have your vision checked yearly. Resources:  www.cdc.gov/injury/STEADI    1). STEADI: Stay Independent Self Risk Assessment    2). CDC Handouts: How to prevent falls, Home Safety Fall Prevention         If you or someone you know struggles with weight issues and joint pain, please check out this important documentary:      \"Start Moving Start Living\" =  Http://startmovingFoodtoeat.[employer]       (Lil Monkey Butt video SMSL FULL SD)            No orders of the defined types were placed in this encounter. Refills/New Prescriptions:  No orders of the defined types were placed in this encounter. Today's prescription medications will be e-scribed (when appropriate) to the Patient's Preferred Pharmacy:   [facility] [address] [place] [address] [place] [phone] [address] [facility] [address]  Phone: [phone] Fax: [phone]         Mauro Snellen MD  Board Certified Orthopaedic Surgeon  Knee and Shoulder Surgery Specialist  Electronically signed by Mauro Snellen MD on 12/4/2017 at 2:15 PM     Contact Information:  Johnathan Montes [place],   [phone], Option 3    This dictation was performed with a verbal recognition program [facility]) and it was checked for errors. It is possible that there are still dictated errors within this office note. If so, please bring any errors to my attention for an addendum. All efforts were made to ensure that this office note is accurate.      I have personally performed and/or participated in the history, physical exam and medical decision making and reviewed all pertinent clinical information unless otherwise noted.

## 2017-12-04 NOTE — PROGRESS NOTES
DEPO-MEDROL CORTISONE INJECTION  NDC# [address]-97  LOT# [id number]  EXP.  DATE: 4/2020  SITE: [facility]

## 2017-12-04 NOTE — PROGRESS NOTES
I have personally performed and/or participated in the history, physical exam and medical decision making and reviewed all pertinent clinical information unless otherwise noted.

## 2017-12-04 NOTE — LETTER
FAXED/SENT TO Dr Debra Duncan  12/4/17, 2:20 PM        Jim Lyman MD  Orthopaedic Surgery & Sports Medicine  Knee & Shoulder Specialist      Dear Dr Debra Duncan,    Thank you very much for your referral of Ms. Ankush Adan to me for evaluation and treatment. Attached below is my report and recommendations from William Jason Ríos most recent office visit. I appreciate your confidence in me and thank you for allowing me the opportunity to care for your patients. If I can be of any further assistance to you on this or any other patient, please do not hesitate to contact me.     Sincerely,    Jim Lyman MD  Board Certified Orthopaedic Surgeon  Knee and Shoulder Surgery Specialist  Electronically signed by Jim Lyman MD on 12/4/2017 at 2:20 PM     Contact Information:  Delmi Lemon,   [phone], Option 3                             I have personally performed and/or participated in the history, physical exam and medical decision making and reviewed all pertinent clinical information unless otherwise noted.                 Jim Lyman MD  Orthopaedic Surgery & Sports Medicine  Knee & Shoulder Specialist       [address] [facility] OFFICE  [address] [place] [address] [facility]    [phone] Option 3   [phone] Option 3  [phone] (fax)    [phone] (fax)       PATIENT: [address]    68 y.o.  female  [facility]: 1940   MRN:  [id number]       Date of current encounter: 12/4/2017  This encounter is evaluated as a:        New Patient Visit     Established Patient Visit    Post-Op Visit      Consult: requested by          Worker's Comp       Patient's PCP is Dr. Terrell Turcios post total right knee replacement using cement 6/10/14              Subjective:     Chief Complaint   Patient presents with    Knee Pain ongoing evaluation and treatment of left knee        HPI:  Reny Schmid is a 68y.o. year old,  female complaining of left knee pain. She states that her left knee pain is approximately a 4. At times she has throbbing achy pain. Her knee pain is intermittent, depending on what she is doing. Stretching, straightening and exercise aggravates her left knee. Her left knee pain is limiting behavior. Rest and Tylenol do help. Her knee is getting worse. She is feeling stiffness behind her knee. She is starting to think about a left total knee replacement but is not quite ready. She would like an injection in her left knee today. PAIN ASSESSMENT:   Pain Assessment  Location of Pain: Knee  Location Modifiers: Left  Severity of Pain: 4  Quality of Pain: Throbbing, Aching  Duration of Pain: Persistent  Frequency of Pain: Intermittent  Date Pain First Started:  (ongoing for years)  Aggravating Factors: Stretching, Straightening, Exercise  Limiting Behavior: Some  Relieving Factors: Rest, Other (Comment) (tylenol)  Result of Injury: No  Work-Related Injury: No  Are there other pain locations you wish to document?: No    Patient Active Problem List   Diagnosis    Hyperlipidemia    Essential hypertension    Hypothyroidism    S/P TKR (total knee replacement) using cement, right    Baker's cyst of knee, left    Primary osteoarthritis of left knee    Chronic pain of right knee    Chronic pain of left knee     Past Medical History:   Diagnosis Date    Arthritis     right ankle, knees    High blood pressure     High cholesterol     Mini stroke ([facility] [address] 01/2017    Rosacea     Thyroid disease      Past Surgical History:   Procedure Laterality Date    ANKLE SURGERY  22 years ago    COLONOSCOPY      KNEE ARTHROPLASTY Right 6/10/14    RIGHT TOTAL KNEE REPLACEMENT              KNEE SURGERY  6/10/14    right TKR       Allergies:  Review of patient's allergies indicates no known allergies. Data Reviewed:     No imaging studies were obtained today. XRays:  (3 views: AP, lateral and patella views) of her right knee taken on 10/13/17 showed a satisfactory TKR with no evidence of acute fracture or loosening. PROCEDURE NOTE: LEFT KNEE INJECTION  12/4/2017 at 2:15 PM   Procedure: Injection  Verbal consent was obtained. Risks and benefits were explained. Questions were encouraged and answered. Timeout Verification Completed including:    Correct patient: Rachna Lynch was identified    Correct procedure    Correct site & side    Correct equipment and supplies    Staff member: Rico Barros MD     Assistant: Jing Grant       Injection Site: Superolateral    The injection site was prepped with Chlora-Prep using aseptic technique and a left knee intra-articular injection was performed with ethyl chloride for anesthetic. Depomedrol 2 ml (40 mg/ml = 80 mg total) was injected. A sterile adhesive dressing was applied. Post procedure:  Ifrah Adan tolerated the treatment well. Instructions to patient:  Appropriate post injections instructions were given to Rachna Lynch. Assessment (Medical Decision Making):     Rachna Lynhc is a 68y.o. year old female with the following diagnosis:    1. Chronic pain of left knee  VA ARTHROCENTESIS ASPIR&/INJ MAJOR JT/BURSA W/O US    VA METHYLPREDNISOLONE 40 MG INJ   2. Primary osteoarthritis of left knee  VA ARTHROCENTESIS ASPIR&/INJ MAJOR JT/BURSA W/O US    VA METHYLPREDNISOLONE 40 MG INJ   3. Baker's cyst of knee, left     4. S/P TKR (total knee replacement) using cement, right on 6/10/2014         Her overall course:         Responding adequately to ongoing treatment      Worsening despite conservative treatment      Unchanged despite conservative treatment    Plan (Medical Decision Making):      I discussed the diagnosis and the treatment options with Rachna Lynch today.    In Summary: Vitamin D3:  Over-the-counter supplement of Vitamin D3, (at least 2,000 IU daily). Vitamin D3 is widely available without a prescription at pharmacies and buying clubs ([place]) and on-line at sites such as Amazon.com. It comes in a variety of formulations (tablets, gelcaps, liquid) and doses (1,000 IU, 2,000 IU, 4,000 IU, 5,000 IU and even higher). The right dose for most people is 2,000 IU per day but higher doses are sometimes needed. You can take your vitamin D3 at any time of the day, with or without food, with or without calcium. Because vitamin D is long acting, if you miss your vitamin D on one day you can double up the dose on a later day. Exercise: Low impact exercise programs such as [employer]. Chair Raise Exercise. Yoga. Adult fitness classes at the  or community [place]. Physical Therapy: Formal physical therapy program to strengthen your lower extremities, improve your balance and gait. Home Assessment: Remove clutter and tripping hazards: loose/throw rugs, cords or cables. Install or placement of railings, grab bars around steps/stairs and in the bathroom (toilet, bath tub, sink). Improve lighting. Foot Wear:  Stable, well fitting. Avoid loose straps or ties, slippery soles. Vision/Eye Check Up: Have your vision checked yearly. Resources:  www.cdc.gov/injury/STEADI    1). STEADI: Stay Independent Self Risk Assessment    2). CDC Handouts: How to prevent falls, Home Safety Fall Prevention         If you or someone you know struggles with weight issues and joint pain, please check out this important documentary:      \"Start Moving Start Living\" =  Http://startmovingstCogniCor Technologiesving.[email or web address]       ([employer] video SMSL FULL SD)            No orders of the defined types were placed in this encounter. Refills/New Prescriptions:  No orders of the defined types were placed in this encounter.     Today's prescription medications will be e-scribed (when appropriate) to the Patient's Preferred Pharmacy:   [facility] [address] Erick Stover [phone]  [address] [place] [address]  Phone: [phone] Fax: [phone]         Sabrina Madrigal MD  Board Certified Orthopaedic Surgeon  Knee and Shoulder Surgery Specialist  Electronically signed by Sabrina Madrigal MD on 12/4/2017 at 2:15 PM     Contact Information:  Ismael Olson,   [phone], Option 3    This dictation was performed with a verbal recognition program [facility]) and it was checked for errors. It is possible that there are still dictated errors within this office note. If so, please bring any errors to my attention for an addendum. All efforts were made to ensure that this office note is accurate. I have personally performed and/or participated in the history, physical exam and medical decision making and reviewed all pertinent clinical information unless otherwise noted.

## 2018-01-15 ENCOUNTER — OFFICE VISIT (OUTPATIENT)
Dept: ORTHOPEDIC SURGERY | Age: 78
End: 2018-01-15

## 2018-01-15 VITALS
SYSTOLIC BLOOD PRESSURE: 135 MMHG | HEART RATE: 84 BPM | HEIGHT: 61 IN | DIASTOLIC BLOOD PRESSURE: 75 MMHG | BODY MASS INDEX: 32.47 KG/M2 | WEIGHT: 171.96 LBS

## 2018-01-15 DIAGNOSIS — M17.12 PRIMARY OSTEOARTHRITIS OF LEFT KNEE: ICD-10-CM

## 2018-01-15 DIAGNOSIS — Z96.651 S/P TKR (TOTAL KNEE REPLACEMENT) USING CEMENT, RIGHT: ICD-10-CM

## 2018-01-15 DIAGNOSIS — M71.22 BAKER'S CYST OF KNEE, LEFT: ICD-10-CM

## 2018-01-15 DIAGNOSIS — G89.29 CHRONIC PAIN OF LEFT KNEE: Primary | ICD-10-CM

## 2018-01-15 DIAGNOSIS — M25.562 CHRONIC PAIN OF LEFT KNEE: Primary | ICD-10-CM

## 2018-01-15 PROCEDURE — 99999 PR OFFICE/OUTPT VISIT,PROCEDURE ONLY: CPT | Performed by: ORTHOPAEDIC SURGERY

## 2018-01-15 PROCEDURE — 20610 DRAIN/INJ JOINT/BURSA W/O US: CPT | Performed by: ORTHOPAEDIC SURGERY

## 2018-01-15 NOTE — PROGRESS NOTES
Sexual activity: Not on file     Other Topics Concern    Not on file     Social History Narrative    No narrative on file     No family history on file. Review of Systems (ROS):    [x]Performed. Rodriguekaiden Adan's review of systems has been performed (Musculoskeletal, Integumentary, CardioPulmonary, Neurological focused) by intake and observation. All past and current ROS forms have been scanned into the medical record. She has been instructed to contact her primary care physician regarding ROS issues if not already being addressed at this time. There are no recent changes. The most recent ROS was scanned into media on 3/21/2017. Objective:   Physical Exam  Vital Signs:  /75   Pulse 84   Ht 5' 0.98\" (1.549 m)   Wt 171 lb 15.3 oz (78 kg)   BMI 32.51 kg/m²     Constitution:  Generally, Eve Art is [x] alert, [x] appears stated age, and [x] in no distress. Her general body habitus is [] Cachectic  [] Thin  [] Normal  [x] Obese  [] Morbidly Obese    Head: [x] Normocephalic  Eyes: [x] Extra-occular muscles intact    Left Ear: [x] External Ear normal   Right Ear: [x] External Ear normal   Nose: [x] Normal  Mouth: [x] Oral mucosa moist  [x] No perioral lesions    Pulmonary: [x] Respirations unlabored and regular  Skin: [x] Warm [x] Well perfused     Psychiatric:   [x] Good judgement and insight  [x] Oriented to [x] person, [x] place, and [x] time. [x] Mood appropriate for circumstances. Gait:   Gait is [] Normal  [x] Impaired Slight limp  Assistive Device: [x] None  [] Knee Brace  [] [facility]  [] Crutches   [] Jessa Hardwick   [] Wheelchair  [] Other     ORTHOPAEDIC KNEE EXAM:  []  RIGHT     [x]  LEFT     []  BILATERAL   Inspection:  [x] Skin intact without abrasion or lacerations.   [x] Ecchymosis:  [x] none  [] mild  [] moderate  [] severe  [x] Atrophy:  [x] none  [x] mild  [] moderate  [] severe  [x] Effusion: [x] none  [] mild  [] moderate  [] severe    [] Scar / Surgical incision(s): [] knee replacement in her lifetime. 2). After considering the various options discussed, Jose Luis Izquierdo elected to pursue a course of treatment that includes the following:      [x] MEDICATIONS/REFILLS prescribed today are listed below. No refills today    [x] Physical Therapy/HEP Activities as tolerated       [] Script: 2 x per week x 4 weeks    [x] Ice to affected area: 15-20 minutes 4 x day    [x] Aspiration left Baker's cyst and injection into her left knee today      [x]  I have informed Jose Luis Izquierdo that I am planning to retire. I have assured her that appropriate follow-up care will be arranged. My official date for concluding my surgical practice was 12/31/2017, and the official longterm date is 3 months later to follow my postoperative patients for 90 days on 3/31/2018. [x]  I have referred her to Dr. Geovany Ngo for future injections as needed. Return to Clinic/Follow - Up:  Jose Luis Izquierdo was asked to make a follow-up appointment:    [x] 6-8 weeks if needed with Dr. Charlee Newton was instructed to call the office if her symptoms worsen or if new symptoms appear prior to the next scheduled visit. She is specifically instructed to contact the office between now & her scheduled appointment if she has concerns related to her condition or if she needs assistance in scheduling the above tests. She is welcome to call for an appointment sooner if she has any additional concerns or questions. Patient Education Materials Provided:    Patient Instructions     Jose Luis Izquierdo was instructed to apply ice to the injection area for 15 - 20 minutes several times a day to decrease pain and and swelling. Ice (\"ICE IS YOUR FRIEND\": try using a bag of frozen peas or corn) for 15  20 minutes 3 x day. Limit activities today. You may resume normal activities tomorrow if you have no pain. For severe pain:  If after hours, she is to go to Emergency Room. PREVENTION:  SIMPLE TIPS    Vitamin D3:  Over-the-counter supplement of Vitamin D3, (at least 2,000 IU daily). Vitamin D3 is widely available without a prescription at pharmacies and buying clubs ([place] [facility], [facility]) and on-line at sites such as Amazon.com. It comes in a variety of formulations (tablets, gelcaps, liquid) and doses (1,000 IU, 2,000 IU, 4,000 IU, 5,000 IU and even higher). The right dose for most people is 2,000 IU per day but higher doses are sometimes needed. You can take your vitamin D3 at any time of the day, with or without food, with or without calcium. Because vitamin D is long acting, if you miss your vitamin D on one day you can double up the dose on a later day. Exercise: Low impact exercise programs such as [employer]. Chair Raise Exercise. Yoga. Adult fitness classes at the  or community [place]. Physical Therapy: Formal physical therapy program to strengthen your lower extremities, improve your balance and gait. Home Assessment: Remove clutter and tripping hazards: loose/throw rugs, cords or cables. Install or placement of railings, grab bars around steps/stairs and in the bathroom (toilet, bath tub, sink). Improve lighting. Foot Wear:  Stable, well fitting. Avoid loose straps or ties, slippery soles. Vision/Eye Check Up: Have your vision checked yearly. Resources:  www.cdc.gov/injury/STEADI    1). STEADI: Stay Independent Self Risk Assessment    2). CDC Handouts:  How to prevent falls, Home Safety Fall Prevention         If you or someone you know struggles with weight issues and joint pain, please check out this important documentary:      \"Start Moving Start Living\" =  Http://startmovingstDriveFactorving.com       (Placido Armando video SMSL FULL SD)              Orders Placed This Encounter   Procedures    Ambulatory referral to Orthopedic Surgery     Referral Priority:   Routine     Referral Type:   Consult for Advice and Opinion     Referral Reason:   Specialty Services Required     Referred to Provider:   Sharyn Talbert MD     Requested Specialty:   Orthopedic Surgery     Number of Visits Requested:   1    PA ARTHROCENTESIS ASPIR&/INJ MAJOR JT/BURSA W/O US    PA METHYLPREDNISOLONE 40 MG INJ       Refills/New Prescriptions:  No orders of the defined types were placed in this encounter. Today's prescription medications will be e-scribed (when appropriate) to the Patient's Preferred Pharmacy:   [address] [place] [phone] [address] [place] [address]  Phone: [phone] Fax: [phone]         Karyle Captain MD  Board Certified Orthopaedic Surgeon  Knee and Shoulder Surgery Specialist  Electronically signed by Karyle Captain MD on 1/15/2018 at 1:28 PM     Contact Information:  Sun Davison,   [phone], Option 3    This dictation was performed with a verbal recognition program [facility]) and it was checked for errors. It is possible that there are still dictated errors within this office note. If so, please bring any errors to my attention for an addendum. All efforts were made to ensure that this office note is accurate. I have personally performed and/or participated in the history, physical exam and medical decision making and reviewed all pertinent clinical information unless otherwise noted.

## 2018-01-15 NOTE — PATIENT INSTRUCTIONS
(Vicodin, Norco)  Tramadol (Ultram)  Other    These medications must have prescriptions which are written and signed by your doctor (Dr. Thalia Naqvi). This means that you must call ahead and come in to the office to  the paper prescription and take it to your pharmacy. We are sorry for any inconvenience but this is now the law. Karyle Captain MD  Board Certified Orthopaedic Surgeon  Knee and Shoulder Surgery Specialist    Contact Information:  Sun Johns,   [phone], Option 3         IMPORTANT NARCOTIC INFORMATION: PLEASE READ     [x] DO NOT share your prescription medication with anyone! Sharing is illegal.  The prescription dose is based on your age, weight, and health problems. Sharing your narcotic prescription can lead to accidental death of the individual for which the prescription was not prescribed. You may not know about his/her addiction problem. [x] Always use the same pharmacy when filling your narcotic prescriptions. [x] DO NOT mix your narcotic prescription with alcohol. Mixing the narcotic prescription medication with alcohol causes depressive effects including breathing problems, organ malfunction, and cardiac arrest.     [x] Always keep your narcotic medication in a locked, secured location. Keep your medication private. This is to avoid individuals from taking your medication without your knowledge. This medication is highly sought after and locking your prescriptions will protect you from being a target of crime. [x] DO NOT stock pile your medication. This also will protect you from being a target of crime. [x] Dispose of any unused medications properly. Do not flush the medications. Look for appropriate waste collection programs in your community and drug take back events. [x] DO NOT drive while taking narcotic pain medication or muscle relaxers.          FOR MORE INFORMATION, CHECK OUT THIS RESOURCE    For your convenience,  and joint pain, please check out this important documentary:      \"Start Moving Start Living\" =  Http://startmovingstartliving.com       (YOUTUBE video SMSL FULL SD)

## 2018-01-15 NOTE — LETTER
Medication Sig Taking? Authorizing Provider   amLODIPine (NORVASC) 5 MG tablet 2.5 mg daily   Historical Provider, MD   aspirin 81 MG tablet Take 81 mg by mouth  Historical Provider, MD   atorvastatin (LIPITOR) 40 MG tablet Take 40 mg by mouth  Historical Provider, MD   metoprolol succinate (TOPROL XL) 25 MG extended release tablet daily   Historical Provider, MD   acetaminophen (TYLENOL) 500 MG tablet Take 500 mg by mouth  Historical Provider, MD   enalapril (VASOTEC) 20 MG tablet 20 mg 2 times daily   Historical Provider, MD   calcium carbonate (TUMS) 500 MG chewable tablet Take  by mouth. Historical Provider, MD   hydrochlorothiazide (HYDRODIURIL) 25 MG tablet Take  by mouth daily. Historical Provider, MD   levothyroxine (SYNTHROID) 50 MCG tablet Take  by mouth daily. Historical Provider, MD     Social History     Social History    Marital status:      Spouse name: N/A    Number of children: 3    Years of education: N/A     Occupational History    retired      Social History Main Topics    Smoking status: Never Smoker    Smokeless tobacco: Never Used    Alcohol use No    Drug use: No    Sexual activity: Not on file     Other Topics Concern    Not on file     Social History Narrative    No narrative on file     No family history on file. Review of Systems (ROS):    Performed. Karolina BRUNNER Loco's review of systems has been performed (Musculoskeletal, Integumentary, CardioPulmonary, Neurological focused) by intake and observation. All past and current ROS forms have been scanned into the medical record. She has been instructed to contact her primary care physician regarding ROS issues if not already being addressed at this time. There are no recent changes. The most recent ROS was scanned into media on 3/21/2017.     Objective:   Physical Exam  Vital Signs:  /75   Pulse 84   Ht 5' 0.98\" (1.549 m)   Wt 171 lb 15.3 oz (78 kg)   BMI 32.51 kg/m²      Constitution: Generally, Tye Tadeo is  alert,  appears stated age, and  in no distress. Her general body habitus is  Cachectic   Thin   Normal   Obese   Morbidly Obese    Head:  Normocephalic  Eyes:  Extra-occular muscles intact    Left Ear:  External Ear normal   Right Ear:  External Ear normal   Nose:  Normal  Mouth:  Oral mucosa moist   No perioral lesions    Pulmonary:  Respirations unlabored and regular  Skin:  Warm  Well perfused     Psychiatric:    Good judgement and insight   Oriented to  person,  place, and  time. Mood appropriate for circumstances. Gait:   Gait is  Normal   Impaired Slight limp  Assistive Device:  None   Knee Brace   Cane   Crutches    Walker    Wheelchair   Other     ORTHOPAEDIC KNEE EXAM:    RIGHT       LEFT       BILATERAL   Inspection:   Skin intact without abrasion or lacerations. Ecchymosis:   none   mild   moderate   severe   Atrophy:   none   mild   moderate   severe   Effusion:  none   mild   moderate   severe     Scar / Surgical incision(s): A-Scope Portals   Open Surgical Incision(s)    Alignment:   Normal   Varus  Valgus    Range of Motion:   Normal Knee ROM          Deferred: acute injury/post-surgery/pain    Limited ROM: 0110 degrees with crepitation    Palpation:    No Tenderness   Tenderness:   mild   moderate   severe    Patellofemoral Crepitation:   none   mild   moderate   severe   Baker's Cyst:   none   small   medium   large   Hilda's Sign:  negative   positive     Motor Function:    No gross motor weakness   Motor weakness:   mild   moderate   severe     Neurologic:   Sensation to light touch intact    Coordination / proprioception intact    Circulation:   The limb is warm and well perfused   Capillary refill is intact.    Edema   none   mild   moderate   severe   Venous stasis changes   none   mild   moderate   severe    Contralateral Knee:   No pain with ROM     Bilateral Hip Exam:   Negative logroll    Data Reviewed: OR METHYLPREDNISOLONE 40 MG INJ    Ambulatory referral to Orthopedic Surgery   3. Baker's cyst of knee, left  Ambulatory referral to Orthopedic Surgery   4. S/P TKR (total knee replacement) using cement, right on 6/10/2014         Her overall course:         Responding adequately to ongoing treatment      Worsening despite conservative treatment      Unchanged despite conservative treatment    Plan (Medical Decision Making):      I discussed the diagnosis and the treatment options with Rboe Alvarez today. In Summary:  1). The various treatment options were outlined and discussed with Robe Caseybenita including:       Conservative care options:      physical therapy, ice, NSAIDs, bracing, and activity modification        The indications for therapeutic injections Steroids and Hyluronic Acid/Synvisc/Euflexxa/Supartz        The indications for (possible future) operative intervention Left total knee replacement in her lifetime. 2). After considering the various options discussed, Robe Alvarez elected to pursue a course of treatment that includes the following:       MEDICATIONS/REFILLS prescribed today are listed below. No refills today     Physical Therapy/HEP Activities as tolerated        Script: 2 x per week x 4 weeks     Ice to affected area: 15-20 minutes 4 x day     Aspiration left Baker's cyst and injection into her left knee today        I have informed Robe Alvarez that I am planning to retire. I have assured her that appropriate follow-up care will be arranged. My official date for concluding my surgical practice was 12/31/2017, and the official [facility] date is 3 months later to follow my postoperative patients for 90 days on 3/31/2018. I have referred her to Dr. Weston Sotelo for future injections as needed.      Return to Clinic/Follow - Up:  Robe Alvarez was asked to make a follow-up appointment:     6-8 weeks if needed with Dr. Norah Wilson Lisa Dias was instructed to call the office if her symptoms worsen or if new symptoms appear prior to the next scheduled visit. She is specifically instructed to contact the office between now & her scheduled appointment if she has concerns related to her condition or if she needs assistance in scheduling the above tests. She is welcome to call for an appointment sooner if she has any additional concerns or questions. Patient Education Materials Provided:    Patient Instructions     Lisa Dias was instructed to apply ice to the injection area for 15 - 20 minutes several times a day to decrease pain and and swelling. Ice (\"ICE IS YOUR FRIEND\": try using a bag of frozen peas or corn) for 15  20 minutes 3 x day. Limit activities today. You may resume normal activities tomorrow if you have no pain. For severe pain:  If after hours, she is to go to Emergency Room. During office hours she must come in to the office. Lisa Dias was instructed to call the office if there are any questions or concerns related to her condition. I have asked her to schedule a follow-up appointment for 6-8 weeks from now for re-evaluation and possible repeat injection. She is specifically instructed to contact the office between now & her scheduled appointment if she has concerns related to her condition. She is welcome to call for an appointment sooner if she has any additional concerns or questions. General Medication Instructions:  Any prescriptions must be used as directed. If you have any concerns, questions or require refills, please contact our office. If you experience any adverse reactions, stop the medication and call our office immediately. If you designate a preferred pharmacy, appropriate prescriptions will be sent to your preferred pharmacy for pickup to be use as directed.     Patient Driving Instructions: Home Assessment: Remove clutter and tripping hazards: loose/throw rugs, cords or cables. Install or placement of railings, grab bars around steps/stairs and in the bathroom (toilet, bath tub, sink). Improve lighting. Foot Wear:  Stable, well fitting. Avoid loose straps or ties, slippery soles. Vision/Eye Check Up: Have your vision checked yearly. Resources:  www.cdc.gov/injury/STEADI    1). STEADI: Stay Independent Self Risk Assessment    2). CDC Handouts: How to prevent falls, Home Safety Fall Prevention         If you or someone you know struggles with weight issues and joint pain, please check out this important documentary:      \"Start Moving Start Living\" =  Http://[employer]       (Rosio Stanley video SMSL FULL SD)              Orders Placed This Encounter   Procedures    Ambulatory referral to Orthopedic Surgery     Referral Priority:   Routine     Referral Type:   Consult for Advice and Opinion     Referral Reason:   Specialty Services Required     Referred to Provider:   Shankar Matthew MD     Requested Specialty:   Orthopedic Surgery     Number of Visits Requested:   1    [place] ARTHROCENTESIS ASPIR&/INJ MAJOR JT/BURSA W/O     [place] METHYLPREDNISOLONE 40 MG INJ       Refills/New Prescriptions:  No orders of the defined types were placed in this encounter.     Today's prescription medications will be e-scribed (when appropriate) to the Patient's Preferred Pharmacy:   [address] [place] [phone]  [address] [address]  Phone: [phone] Fax: [phone]         David Betancur MD  Board Certified Orthopaedic Surgeon  Knee and Shoulder Surgery Specialist  Electronically signed by David Betancur MD on 1/15/2018 at 1:28 PM     Contact Information:  [address],   [phone], Option 3    This dictation was performed with a verbal recognition program (DRAGON) and it was checked for errors. It is possible that there are still dictated errors within this office note. If so, please bring any errors to my attention for an addendum. All efforts were made to ensure that this office note is accurate. I have personally performed and/or participated in the history, physical exam and medical decision making and reviewed all pertinent clinical information unless otherwise noted.

## 2018-01-15 NOTE — PROGRESS NOTES
LIDOCAINE  NDC: 9908-2037-52  LOT: [id number]  EXP: 7/1/2018    DEPO-MEDROL  NDC: [address]-48  LOT: [id number]  EXP: 4/2020  SITE: Lt. Knee     Aspiration of Lt. Knee Ann's cyst.  Injection Lt.  Knee

## 2021-01-20 ENCOUNTER — IMMUNIZATION (OUTPATIENT)
Dept: PRIMARY CARE CLINIC | Age: 81
End: 2021-01-20
Payer: MEDICARE

## 2021-01-20 PROCEDURE — 91300 COVID-19, PFIZER VACCINE 30MCG/0.3ML DOSE: CPT | Performed by: FAMILY MEDICINE

## 2021-01-20 PROCEDURE — 0001A COVID-19, PFIZER VACCINE 30MCG/0.3ML DOSE: CPT | Performed by: FAMILY MEDICINE

## 2021-02-10 ENCOUNTER — IMMUNIZATION (OUTPATIENT)
Dept: PRIMARY CARE CLINIC | Age: 81
End: 2021-02-10
Payer: MEDICARE

## 2021-02-10 PROCEDURE — 0002A COVID-19, PFIZER VACCINE 30MCG/0.3ML DOSE: CPT | Performed by: FAMILY MEDICINE

## 2021-02-10 PROCEDURE — 91300 COVID-19, PFIZER VACCINE 30MCG/0.3ML DOSE: CPT | Performed by: FAMILY MEDICINE
